# Patient Record
Sex: MALE | Race: WHITE | NOT HISPANIC OR LATINO | URBAN - METROPOLITAN AREA
[De-identification: names, ages, dates, MRNs, and addresses within clinical notes are randomized per-mention and may not be internally consistent; named-entity substitution may affect disease eponyms.]

---

## 2019-02-01 ENCOUNTER — TELEPHONE (OUTPATIENT)
Dept: UROLOGY | Facility: MEDICAL CENTER | Age: 34
End: 2019-02-01

## 2019-02-01 NOTE — TELEPHONE ENCOUNTER
Patient mailed new patient paperwork and Vasectomy letter mailed as well    Patient has appointment in [facility] on 02/15/2019 at 9:45 am   Patient states he has [employer] member ID [id number] customer service number [phone]

## 2019-02-01 NOTE — TELEPHONE ENCOUNTER
New Patient Intake form:    Complaint/Diagnosis:  Vascectomy    Insurance:[employer]    History of Cancer: No    Previous urologist: No    Outside Testing/where:N/A  If yes, what kind:    Records Requested/Where:has not seen doctor in years    Preferred location:[place]

## 2021-04-15 DIAGNOSIS — Z23 ENCOUNTER FOR IMMUNIZATION: ICD-10-CM
